# Patient Record
Sex: MALE | Race: WHITE | Employment: FULL TIME | URBAN - METROPOLITAN AREA
[De-identification: names, ages, dates, MRNs, and addresses within clinical notes are randomized per-mention and may not be internally consistent; named-entity substitution may affect disease eponyms.]

---

## 2020-03-25 ENCOUNTER — HOSPITAL ENCOUNTER (EMERGENCY)
Facility: CLINIC | Age: 40
Discharge: HOME | End: 2020-03-25
Attending: NURSE PRACTITIONER | Admitting: NURSE PRACTITIONER
Payer: COMMERCIAL

## 2020-03-25 VITALS
HEIGHT: 73 IN | DIASTOLIC BLOOD PRESSURE: 70 MMHG | SYSTOLIC BLOOD PRESSURE: 125 MMHG | BODY MASS INDEX: 29.55 KG/M2 | TEMPERATURE: 97.6 F | OXYGEN SATURATION: 99 % | WEIGHT: 223 LBS | RESPIRATION RATE: 18 BRPM

## 2020-03-25 DIAGNOSIS — S61.112A LACERATION WITHOUT FOREIGN BODY OF LEFT THUMB WITH DAMAGE TO NAIL, INITIAL ENCOUNTER: ICD-10-CM

## 2020-03-25 PROCEDURE — 12001 RPR S/N/AX/GEN/TRNK 2.5CM/<: CPT | Mod: Z6 | Performed by: NURSE PRACTITIONER

## 2020-03-25 PROCEDURE — 99283 EMERGENCY DEPT VISIT LOW MDM: CPT | Performed by: NURSE PRACTITIONER

## 2020-03-25 PROCEDURE — 12001 RPR S/N/AX/GEN/TRNK 2.5CM/<: CPT | Performed by: NURSE PRACTITIONER

## 2020-03-25 PROCEDURE — 99283 EMERGENCY DEPT VISIT LOW MDM: CPT | Mod: 25 | Performed by: NURSE PRACTITIONER

## 2020-03-25 ASSESSMENT — MIFFLIN-ST. JEOR: SCORE: 1985.4

## 2020-03-25 NOTE — PHARMACY
Writer called into room per Med Scribe [Temp] protocol.    Unable to Medication Reconcile patient per ED nurse Nela.    Will attempt at another opportunity.

## 2020-03-25 NOTE — ED AVS SNAPSHOT
[facility]  [address] [facility] [address] [address]  Phone:  [phone]  Fax:  [phone]                                    Aric Alcala   MRN: [id number]    Department:  [facility]   Date of Visit:  3/25/2020           After Visit Summary Signature Page    I have received my discharge instructions, and my questions have been answered. I have discussed any challenges I see with this plan with the nurse or doctor.    ..........................................................................................................................................  Patient/Patient Representative Signature      ..........................................................................................................................................  Patient Representative Print Name and Relationship to Patient    ..................................................               ................................................  Date                                   Time    ..........................................................................................................................................  Reviewed by Signature/Title    ...................................................              ..............................................  Date                                               Time          22EPIC Rev 08/18

## 2020-03-25 NOTE — ED NOTES
Pt has lac to left thumb from today, he cutting sheet rock with new blade, CMS intact, bleeding controled

## 2020-03-25 NOTE — DISCHARGE INSTRUCTIONS
Return for suture removal in 8-9 days  Apply bacitracin or vaseline or aquaphor twice daily until well healed  Leave open to air as much as possible after removing bandage tomorrow am.  Return if concerns of infection  Tetanus was updated in 2018 and not indicated today  8 sutures placed

## 2020-03-25 NOTE — ED PROVIDER NOTES
"  History     Chief Complaint   Patient presents with     Laceration     left thumb laceration while cutting sheetrock     HPI  Aric Alcala is a 39 year old male who presents to the emergency department with a left thumb laceration that was incurred while at home today and attempting to cut sheet rock with an Exacto knife and subsequently lacerated the lateral distal pad of his left thumb.  Patient reports mild pain initially and currently there is no pain.  Patient believes his tetanus is up-to-date.  Patient states while this is not a work-related injury his work requires that he hold a handgun as he is a  and he is a left-handed shooter.  Patient denies fever, aches, chills, sweats, ear pain, eye pain, throat pain, cough, wheezing, shortness of breath, abdominal pain, nausea, vomiting, diarrhea, dysuria, speech difficulty, mental confusion, thoughts of harming self.  Patient reports feeling well otherwise    Allergies:  No Known Allergies    Problem List:    There are no active problems to display for this patient.       Past Medical History:    No past medical history on file.    Past Surgical History:    No past surgical history on file.    Family History:    No family history on file.    Social History:  Marital Status:  Single [1]  Social History     Tobacco Use     Smoking status: Not on file   Substance Use Topics     Alcohol use: Not on file     Drug use: Not on file        Medications:    No current outpatient medications on file.        Review of Systems  As mentioned above in the history present illness. All other systems were reviewed and are negative.    Physical Exam   BP: 125/70  Heart Rate: 66  Temp: 97.6  F (36.4  C)  Resp: 18  Height: 185.4 cm (6' 1\")  Weight: 101.2 kg (223 lb)  SpO2: 99 %      Physical Exam  Vitals signs and nursing note reviewed.   Constitutional:       General: He is not in acute distress.     Appearance: He is well-developed. He is not diaphoretic.   HENT: " "     Head: Normocephalic and atraumatic.      Right Ear: External ear normal.      Left Ear: External ear normal.      Nose: Nose normal.   Eyes:      General:         Right eye: No discharge.         Left eye: No discharge.      Conjunctiva/sclera: Conjunctivae normal.   Cardiovascular:      Rate and Rhythm: Normal rate and regular rhythm.      Heart sounds: Normal heart sounds. No murmur. No friction rub. No gallop.    Pulmonary:      Effort: Pulmonary effort is normal.      Breath sounds: Normal breath sounds. No stridor. No wheezing.   Musculoskeletal:      Left hand: He exhibits tenderness (at laceration site) and laceration (2 cm by 4 mm depth  U shaped flap laceration without bony or tendon involvement with a sliver of distal lateral nail bed involvement). He exhibits normal range of motion, no bony tenderness, normal two-point discrimination, normal capillary refill, no deformity and no swelling. Normal sensation noted. Decreased sensation is not present in the ulnar distribution, is not present in the medial redistribution and is not present in the radial distribution. Normal strength noted. He exhibits no finger abduction.   Skin:     General: Skin is warm.      Findings: No rash.   Neurological:      Mental Status: He is alert and oriented to person, place, and time.         ED Course        Procedures    [facility] Procedure Note          Laceration Repair:      Laceration repair  Performed by: DIOGENES Ryo CNP  Authorized by: DIOGENES Roy CNP  Consent: Verbal consent obtained.  Risks and benefits: risks, benefits and alternatives were discussed  Patient understanding: patient states understanding of the procedure being performed  Site marked: the operative site was identified  Time out: Immediately prior to procedure a \"time out\" was called to verify the correct patient, procedure, equipment, support staff and site/side marked as required.    Preparation: Patient was prepped and " draped in usual sterile fashion.  Irrigation solution: saline    Body area: left thumb distal third and lateral side of thumb with sliver of distal lateral nail bed shaved off  Laceration length: 2 cm by 4 mm depth  Contamination: The wound is not contaminated.  Foreign bodies:none  Tendon involvement:none  Anesthesia: Digital blockinfiltration  Local anesthetic: Bupivacaine 0.25% withoutepinephrine and without buffer.  Anesthetic total: 6 ml    Debridement: irrigated with tap water and hibiclens  Skin closure:A total of 8  4.0 Ethylon  Technique: interrupted  Approximation: close  Approximation difficulty: simple      Patient tolerance: Patient tolerated the procedure well with no immediate complications.    No results found for this or any previous visit (from the past 24 hour(s)).    Medications - No data to display    Assessments & Plan (with Medical Decision Making)  Patient presents to the emergency room with a left thumb laceration that is a near avulsion without bony or tendon involvement.  Digital block placed.  8 sutures placed.  Patient tolerated the procedure well.  Patient neurovascularly stable pre-and post procedure and subsequently patient discharged in stable condition.  Due to workability of needing to utilize his thumb for possible handgun use recommend be off work until Monday the 30th.  Wound care discussed.  Patient discharged in stable condition.  Tetanus not indicated today and was updated in 2018.     I have reviewed the nursing notes.    I have reviewed the findings, diagnosis, plan and need for follow up with the patient.      There are no discharge medications for this patient.      Final diagnoses:   Laceration without foreign body of left thumb with damage to nail, initial encounter       3/25/2020   [facility] EMERGENCY DEPARTMENT     Christi Ferreira APRN CNP  03/25/20 1022       Christi Ferreira APRN CNP  03/27/20 [id number]

## 2022-04-05 ENCOUNTER — OFFICE VISIT (OUTPATIENT)
Dept: FAMILY MEDICINE | Facility: CLINIC | Age: 42
End: 2022-04-05
Payer: COMMERCIAL

## 2022-04-05 VITALS
HEIGHT: 73 IN | OXYGEN SATURATION: 98 % | WEIGHT: 226 LBS | BODY MASS INDEX: 29.95 KG/M2 | SYSTOLIC BLOOD PRESSURE: 100 MMHG | TEMPERATURE: 96.6 F | HEART RATE: 49 BPM | DIASTOLIC BLOOD PRESSURE: 67 MMHG

## 2022-04-05 DIAGNOSIS — D22.9 NUMEROUS MOLES: ICD-10-CM

## 2022-04-05 DIAGNOSIS — Z13.1 SCREENING FOR DIABETES MELLITUS: ICD-10-CM

## 2022-04-05 DIAGNOSIS — Z00.00 ROUTINE GENERAL MEDICAL EXAMINATION AT A HEALTH CARE FACILITY: Primary | ICD-10-CM

## 2022-04-05 DIAGNOSIS — Z13.220 SCREENING FOR HYPERLIPIDEMIA: ICD-10-CM

## 2022-04-05 PROBLEM — S41.152D DOG BITE OF ARM, LEFT, SUBSEQUENT ENCOUNTER: Status: ACTIVE | Noted: 2018-10-17
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
## 2022-04-05 PROBLEM — L08.9 SKIN INFECTION: Status: ACTIVE | Noted: 2018-10-24

## 2022-04-05 LAB
CHOLEST SERPL-MCNC: 173 MG/DL
FASTING STATUS PATIENT QL REPORTED: NO
FASTING STATUS PATIENT QL REPORTED: NO
GLUCOSE BLD-MCNC: 85 MG/DL (ref 70–99)
HDLC SERPL-MCNC: 43 MG/DL
LDLC SERPL CALC-MCNC: 99 MG/DL
NONHDLC SERPL-MCNC: 130 MG/DL
TRIGL SERPL-MCNC: 155 MG/DL

## 2022-04-05 PROCEDURE — 36415 COLL VENOUS BLD VENIPUNCTURE: CPT | Performed by: NURSE PRACTITIONER

## 2022-04-05 PROCEDURE — 82947 ASSAY GLUCOSE BLOOD QUANT: CPT | Performed by: NURSE PRACTITIONER

## 2022-04-05 PROCEDURE — 99386 PREV VISIT NEW AGE 40-64: CPT | Performed by: NURSE PRACTITIONER

## 2022-04-05 PROCEDURE — 80061 LIPID PANEL: CPT | Performed by: NURSE PRACTITIONER

## 2022-04-05 ASSESSMENT — ENCOUNTER SYMPTOMS
DIARRHEA: 0
EYE PAIN: 0
HEMATURIA: 0
HEMATOCHEZIA: 0
NAUSEA: 0
MYALGIAS: 0
COUGH: 0
SORE THROAT: 0
FREQUENCY: 0
NERVOUS/ANXIOUS: 1
SHORTNESS OF BREATH: 0
DYSURIA: 0
CHILLS: 0
HEARTBURN: 1
WEAKNESS: 0
PALPITATIONS: 0
HEADACHES: 0
PARESTHESIAS: 0
JOINT SWELLING: 0
CONSTIPATION: 0
FEVER: 0
DIZZINESS: 0
ARTHRALGIAS: 0
ABDOMINAL PAIN: 0

## 2022-04-05 ASSESSMENT — PATIENT HEALTH QUESTIONNAIRE - PHQ9
10. IF YOU CHECKED OFF ANY PROBLEMS, HOW DIFFICULT HAVE THESE PROBLEMS MADE IT FOR YOU TO DO YOUR WORK, TAKE CARE OF THINGS AT HOME, OR GET ALONG WITH OTHER PEOPLE: SOMEWHAT DIFFICULT
SUM OF ALL RESPONSES TO PHQ QUESTIONS 1-9: 6
SUM OF ALL RESPONSES TO PHQ QUESTIONS 1-9: 6

## 2022-04-05 NOTE — PROGRESS NOTES
SUBJECTIVE:   CC: Aric Alcala is an 41 year old male who presents for preventative health visit.     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   Doing therapy once a week.  Thinking about medical marijuana.  Taking melatonin and CBD to help him sleep, seems to be working well.  Declines any assistance with these issues today.  No SI or HI.       Scheduled for derm consult in June, needs referral placed.  Going for a few moles that his wife is concerned about.  Denies hx of previous skin cancer.       Started working out about 6 weeks ago, prior to that hadn't worked out in years.       Patient has been advised of split billing requirements and indicates understanding: Yes  Healthy Habits:     Getting at least 3 servings of Calcium per day:  Yes    Bi-annual eye exam:  NO    Dental care twice a year:  Yes    Sleep apnea or symptoms of sleep apnea:  Excessive snoring    Diet:  Regular (no restrictions)    Frequency of exercise:  2-3 days/week    Duration of exercise:  45-60 minutes    Taking medications regularly:  Yes    Medication side effects:  None    PHQ-2 Total Score: 3    Additional concerns today:  No      Today's PHQ-2 Score:   PHQ-2 ( 1999 [employer]) 4/5/2022   Q1: Little interest or pleasure in doing things 2   Q2: Feeling down, depressed or hopeless 1   PHQ-2 Score 3   Q1: Little interest or pleasure in doing things More than half the days   Q2: Feeling down, depressed or hopeless Several days   PHQ-2 Score 3       Abuse: Current or Past(Physical, Sexual or Emotional)- No  Do you feel safe in your environment? Yes    Have you ever done Advance Care Planning? (For example, a Health Directive, POLST, or a discussion with a medical provider or your loved ones about your wishes): No, advance care planning information given to patient to review.  Patient plans to discuss their wishes with loved ones or provider.      Social History     Tobacco Use     Smoking status: Never Smoker     Smokeless  "tobacco: Never Used   Substance Use Topics     Alcohol use: Not Currently     If you drink alcohol do you typically have >3 drinks per day or >7 drinks per week? Not applicable    Alcohol Use 4/5/2022   Prescreen: >3 drinks/day or >7 drinks/week? Not Applicable   Prescreen: >3 drinks/day or >7 drinks/week? -       Last PSA: No results found for: PSA    Reviewed orders with patient. Reviewed health maintenance and updated orders accordingly - Yes      Reviewed and updated as needed this visit by clinical staff   Tobacco  Allergies  Meds  Problems  Med Hx  Surg Hx  Fam Hx          Reviewed and updated as needed this visit by Provider   Tobacco  Allergies  Meds  Problems  Med Hx  Surg Hx  Fam Hx           Review of Systems   Constitutional: Negative for chills and fever.   HENT: Negative for congestion, ear pain, hearing loss and sore throat.    Eyes: Negative for pain and visual disturbance.   Respiratory: Negative for cough and shortness of breath.    Cardiovascular: Negative for chest pain, palpitations and peripheral edema.   Gastrointestinal: Positive for heartburn. Negative for abdominal pain, constipation, diarrhea, hematochezia and nausea.   Genitourinary: Negative for dysuria, frequency, genital sores, hematuria, impotence, penile discharge and urgency.   Musculoskeletal: Negative for arthralgias, joint swelling and myalgias.   Skin: Negative for rash.   Neurological: Negative for dizziness, weakness, headaches and paresthesias.   Psychiatric/Behavioral: Positive for mood changes. The patient is nervous/anxious.        OBJECTIVE:   /67   Pulse (!) 49   Temp (!) 96.6  F (35.9  C)   Ht 1.854 m (6' 1\")   Wt 102.5 kg (226 lb)   SpO2 98%   BMI 29.82 kg/m      Physical Exam  GENERAL: healthy, alert and no distress  EYES: Eyes grossly normal to inspection, PERRL and conjunctivae and sclerae normal  HENT: ear canals and TM's normal, nose and mouth without ulcers or lesions  NECK: no " "adenopathy, no asymmetry, masses, or scars and thyroid normal to palpation  RESP: lungs clear to auscultation - no rales, rhonchi or wheezes  CV: regular rate and rhythm, normal S1 S2, no S3 or S4, no murmur, click or rub, no peripheral edema and peripheral pulses strong  ABDOMEN: soft, nontender, no hepatosplenomegaly, no masses and bowel sounds normal  MS: no gross musculoskeletal defects noted, no edema  SKIN: no suspicious lesions or rashes.  Numerous tan macules scattered on upper back.      NEURO: Normal strength and tone, mentation intact and speech normal  PSYCH: mentation appears normal, affect normal/bright    Diagnostic Test Results:  Labs reviewed in Epic      ASSESSMENT/PLAN:   (Z00.00) Routine general medical examination at a health care facility  (primary encounter diagnosis)  Comment:   Plan: continue annual physical exams    (Z13.220) Screening for hyperlipidemia  Comment:   Plan: Lipid panel reflex to direct LDL Fasting          (Z13.1) Screening for diabetes mellitus  Comment:   Plan: Glucose          (D22.9) Numerous moles  Comment: already scheduled for appt with derm on 6/10/22, states he needs referral.    Plan: Adult Dermatology Referral            Patient has been advised of split billing requirements and indicates understanding: Yes    COUNSELING:   Reviewed preventive health counseling, as reflected in patient instructions    Estimated body mass index is 29.82 kg/m  as calculated from the following:    Height as of this encounter: 1.854 m (6' 1\").    Weight as of this encounter: 102.5 kg (226 lb).     Weight management plan: work on diet and exercise    He reports that he has never smoked. He has never used smokeless tobacco.      Counseling Resources:  ATP IV Guidelines  Pooled Cohorts Equation Calculator  FRAX Risk Assessment  ICSI Preventive Guidelines  Dietary Guidelines for Americans, 2010  USDA's MyPlate  ASA Prophylaxis  Lung CA Screening    Annetta Mehta, [facility] " [facility] [place]  Answers for HPI/ROS submitted by the patient on 4/5/2022  If you checked off any problems, how difficult have these problems made it for you to do your work, take care of things at home, or get along with other people?: Somewhat difficult  PHQ9 TOTAL SCORE: 6

## 2022-04-05 NOTE — PATIENT INSTRUCTIONS
Referral placed to dermatology.  Okay to keep appointment in June.    Labs today for blood sugar and cholesterol.  I'll send a message regarding results via [employer].       Preventive Health Recommendations  Male Ages 40 to 49    Yearly exam:             See your health care provider every year in order to  o   Review health changes.   o   Discuss preventive care.    o   Review your medicines if your doctor has prescribed any.    You should be tested each year for STDs (sexually transmitted diseases) if you re at risk.     Have a cholesterol test every 5 years.     Have a colonoscopy (test for colon cancer) if someone in your family has had colon cancer or polyps before age 50.     After age 45, have a diabetes test (fasting glucose). If you are at risk for diabetes, you should have this test every 3 years.      Talk with your health care provider about whether or not a prostate cancer screening test (PSA) is right for you.    Shots: Get a flu shot each year. Get a tetanus shot every 10 years.     Nutrition:    Eat at least 5 servings of fruits and vegetables daily.     Eat whole-grain bread, whole-wheat pasta and brown rice instead of white grains and rice.     Get adequate Calcium and Vitamin D.     Lifestyle    Exercise for at least 150 minutes a week (30 minutes a day, 5 days a week). This will help you control your weight and prevent disease.     Limit alcohol to one drink per day.     No smoking.     Wear sunscreen to prevent skin cancer.     See your dentist every six months for an exam and cleaning.

## 2022-04-06 ASSESSMENT — PATIENT HEALTH QUESTIONNAIRE - PHQ9: SUM OF ALL RESPONSES TO PHQ QUESTIONS 1-9: 6

## 2022-10-11 ENCOUNTER — APPOINTMENT (OUTPATIENT)
Dept: GENERAL RADIOLOGY | Facility: CLINIC | Age: 42
End: 2022-10-11
Attending: NURSE PRACTITIONER
Payer: COMMERCIAL

## 2022-10-11 ENCOUNTER — NURSE TRIAGE (OUTPATIENT)
Dept: NURSING | Facility: CLINIC | Age: 42
End: 2022-10-11

## 2022-10-11 ENCOUNTER — HOSPITAL ENCOUNTER (EMERGENCY)
Facility: CLINIC | Age: 42
Discharge: HOME | End: 2022-10-11
Attending: NURSE PRACTITIONER | Admitting: NURSE PRACTITIONER
Payer: COMMERCIAL

## 2022-10-11 VITALS
BODY MASS INDEX: 29.16 KG/M2 | OXYGEN SATURATION: 99 % | HEIGHT: 73 IN | DIASTOLIC BLOOD PRESSURE: 85 MMHG | RESPIRATION RATE: 18 BRPM | WEIGHT: 220 LBS | HEART RATE: 59 BPM | TEMPERATURE: 96.8 F | SYSTOLIC BLOOD PRESSURE: 136 MMHG

## 2022-10-11 DIAGNOSIS — S20.211A RIB CONTUSION, RIGHT, INITIAL ENCOUNTER: ICD-10-CM

## 2022-10-11 DIAGNOSIS — R07.89 RIGHT-SIDED CHEST WALL PAIN: ICD-10-CM

## 2022-10-11 DIAGNOSIS — R55 SYNCOPE: ICD-10-CM

## 2022-10-11 LAB
ALBUMIN SERPL BCG-MCNC: 4.2 G/DL (ref 3.5–5.2)
ALP SERPL-CCNC: 59 U/L (ref 40–129)
ALT SERPL W P-5'-P-CCNC: 28 U/L (ref 10–50)
ANION GAP SERPL CALCULATED.3IONS-SCNC: 8 MMOL/L (ref 7–15)
AST SERPL W P-5'-P-CCNC: 22 U/L (ref 10–50)
BASOPHILS # BLD AUTO: 0.1 10E3/UL (ref 0–0.2)
BASOPHILS NFR BLD AUTO: 1 %
BILIRUB SERPL-MCNC: 1 MG/DL
BUN SERPL-MCNC: 20.9 MG/DL (ref 6–20)
CALCIUM SERPL-MCNC: 9.6 MG/DL (ref 8.6–10)
CHLORIDE SERPL-SCNC: 103 MMOL/L (ref 98–107)
CREAT SERPL-MCNC: 1.13 MG/DL (ref 0.67–1.17)
DEPRECATED HCO3 PLAS-SCNC: 30 MMOL/L (ref 22–29)
EOSINOPHIL # BLD AUTO: 0.4 10E3/UL (ref 0–0.7)
EOSINOPHIL NFR BLD AUTO: 4 %
ERYTHROCYTE [DISTWIDTH] IN BLOOD BY AUTOMATED COUNT: 12.7 % (ref 10–15)
GFR SERPL CREATININE-BSD FRML MDRD: 84 ML/MIN/1.73M2
GLUCOSE SERPL-MCNC: 75 MG/DL (ref 70–99)
HCT VFR BLD AUTO: 46.7 % (ref 40–53)
HGB BLD-MCNC: 15.2 G/DL (ref 13.3–17.7)
IMM GRANULOCYTES # BLD: 0 10E3/UL
IMM GRANULOCYTES NFR BLD: 0 %
LYMPHOCYTES # BLD AUTO: 2.3 10E3/UL (ref 0.8–5.3)
LYMPHOCYTES NFR BLD AUTO: 27 %
MCH RBC QN AUTO: 27.7 PG (ref 26.5–33)
MCHC RBC AUTO-ENTMCNC: 32.5 G/DL (ref 31.5–36.5)
MCV RBC AUTO: 85 FL (ref 78–100)
MONOCYTES # BLD AUTO: 0.5 10E3/UL (ref 0–1.3)
MONOCYTES NFR BLD AUTO: 6 %
NEUTROPHILS # BLD AUTO: 5.1 10E3/UL (ref 1.6–8.3)
NEUTROPHILS NFR BLD AUTO: 62 %
NRBC # BLD AUTO: 0 10E3/UL
NRBC BLD AUTO-RTO: 0 /100
PLATELET # BLD AUTO: 205 10E3/UL (ref 150–450)
POTASSIUM SERPL-SCNC: 4.3 MMOL/L (ref 3.4–5.3)
PROT SERPL-MCNC: 7.3 G/DL (ref 6.4–8.3)
RBC # BLD AUTO: 5.48 10E6/UL (ref 4.4–5.9)
SODIUM SERPL-SCNC: 141 MMOL/L (ref 136–145)
TSH SERPL DL<=0.005 MIU/L-ACNC: 2.25 UIU/ML (ref 0.3–4.2)
WBC # BLD AUTO: 8.4 10E3/UL (ref 4–11)

## 2022-10-11 PROCEDURE — 36415 COLL VENOUS BLD VENIPUNCTURE: CPT | Performed by: NURSE PRACTITIONER

## 2022-10-11 PROCEDURE — 85025 COMPLETE CBC W/AUTO DIFF WBC: CPT | Performed by: NURSE PRACTITIONER

## 2022-10-11 PROCEDURE — 86618 LYME DISEASE ANTIBODY: CPT | Performed by: NURSE PRACTITIONER

## 2022-10-11 PROCEDURE — 99284 EMERGENCY DEPT VISIT MOD MDM: CPT

## 2022-10-11 PROCEDURE — 84443 ASSAY THYROID STIM HORMONE: CPT | Performed by: NURSE PRACTITIONER

## 2022-10-11 PROCEDURE — 80053 COMPREHEN METABOLIC PANEL: CPT | Performed by: NURSE PRACTITIONER

## 2022-10-11 PROCEDURE — 71101 X-RAY EXAM UNILAT RIBS/CHEST: CPT | Mod: RT

## 2022-10-11 PROCEDURE — 99282 EMERGENCY DEPT VISIT SF MDM: CPT | Performed by: NURSE PRACTITIONER

## 2022-10-11 ASSESSMENT — ACTIVITIES OF DAILY LIVING (ADL): ADLS_ACUITY_SCORE: 33

## 2022-10-11 NOTE — TELEPHONE ENCOUNTER
Annetta spouse is calling and patient is not present to triage.  FNA advised to phone back when present with spouse and Annetta agrees.     Reason for Disposition    Information only question and nurse able to answer    Additional Information    Negative: Nursing judgment    Negative: Nursing judgment    Negative: Nursing judgment    Negative: Nursing judgment    Protocols used: INFORMATION ONLY CALL - NO TRIAGE-A-OH

## 2022-10-11 NOTE — DISCHARGE INSTRUCTIONS
I recommend primary care referral for further ongoing care.  I recommend a Holter monitor to make sure there is no cardiac etiology for your sometimes.  After your Holter monitor please follow-up with primary care to review results.

## 2022-10-11 NOTE — ED TRIAGE NOTES
Pt here with right rib pain that occurred two weeks ago tomorrow after passing out. Pt was on a fishing trip with his friends, not drinking and suddenly passed out while looking at a phone, hit his head, was out for 30 seconds. Pt's Wife wants to know why he passed out. Pt said he did only eat once that day. Pt did say two weeks prior he would get light headed after bending over. Denies chest pain.      Triage Assessment     Row Name 10/11/22 1138       Triage Assessment (Adult)    Airway WDL WDL       Respiratory WDL    Respiratory WDL WDL       Cardiac WDL    Cardiac WDL WDL       Cognitive/Neuro/Behavioral WDL    Cognitive/Neuro/Behavioral WDL WDL

## 2022-10-11 NOTE — ED PROVIDER NOTES
History     Chief Complaint   Patient presents with     Rib Pain     LAB REQUEST     Pt wants lyme disease and blood work     HPI  Aric Alcala is a 42 year old male who presents to the emergency room with right-sided rib pain following a fall.  Patient states he experienced a syncopal episode.  Patient states he was feeling lightheaded and went outside to get some fresh air and must have passed out.  Patient denies alcohol involvement at the time.  Patient states he had not had anything to eat either.  He was at a cabin for the weekend with his friends.  Patient states 2 weeks ago he fell and landed on his right side/ribs.  Patient reports persistent rib pain that is aggravated by deep inspiration.  Patient denies cough, shortness of breath, fever, aches, chills, sweats.  Patient denies abdominal pain, nausea, vomiting, diarrhea, constipation, dysuria, hematuria, mental confusion, left or right-sided body weakness, suicidal or homicidal thoughts.  Patient states he is wondering the cause of his syncopal episode.  Patient denies history of thyroid disease, heart disease, diabetes, previous syncopal episodes.    Allergies:  No Known Allergies    Problem List:    Patient Active Problem List    Diagnosis Date Noted     Skin infection 10/24/2018     Priority: Medium     Dog bite of arm, left, subsequent encounter 10/17/2018     Priority: Medium        Past Medical History:    History reviewed. No pertinent past medical history.    Past Surgical History:    History reviewed. No pertinent surgical history.    Family History:    Family History   Problem Relation Age of Onset     Pancreatic Cancer Mother      Hyperlipidemia Father      Hypertension Father      No Known Problems Half-Brother      No Known Problems Half-Brother        Social History:  Marital Status:  Single [1]  Social History     Tobacco Use     Smoking status: Never     Smokeless tobacco: Never   Vaping Use     Vaping Use: Never used   Substance Use  "Topics     Alcohol use: Not Currently     Drug use: Never        Medications:    Multiple Vitamin (MULTIVITAMIN ADULT PO)  omeprazole (PRILOSEC) 20 MG DR capsule      Review of Systems  As mentioned above in the history present illness. All other systems were reviewed and are negative.    Physical Exam   BP: 136/85  Pulse: 59  Temp: 96.8  F (36  C)  Resp: 18  Height: 185.4 cm (6' 1\")  Weight: 99.8 kg (220 lb)  SpO2: 99 %      Physical Exam  Vitals and nursing note reviewed.   Constitutional:       General: He is not in acute distress.     Appearance: He is well-developed and well-nourished. He is not ill-appearing, toxic-appearing or diaphoretic.   HENT:      Head: Normocephalic and atraumatic.      Right Ear: Hearing, tympanic membrane, ear canal and external ear normal.      Left Ear: Hearing, tympanic membrane, ear canal and external ear normal.      Nose: Nose normal.      Mouth/Throat:      Mouth: Oropharynx is clear and moist. Mucous membranes are moist.      Pharynx: No oropharyngeal exudate or posterior oropharyngeal erythema.   Eyes:      General: Lids are normal.         Right eye: No discharge.         Left eye: No discharge.      Extraocular Movements: Extraocular movements intact.      Right eye: No nystagmus.      Left eye: No nystagmus.      Conjunctiva/sclera: Conjunctivae normal.      Pupils: Pupils are equal, round, and reactive to light.   Neck:      Vascular: No carotid bruit.   Cardiovascular:      Rate and Rhythm: Normal rate and regular rhythm.      Pulses: Intact distal pulses.      Heart sounds: Normal heart sounds. No murmur heard.    No friction rub. No gallop.   Pulmonary:      Effort: Pulmonary effort is normal. No respiratory distress.      Breath sounds: Normal breath sounds. No stridor. No wheezing or rales.   Chest:      Chest wall: Tenderness (chest wall tenderness mid clavicular line to mid axillary line ribs 12 area without soft tissue swelling, crepitus, bruising) present. "   Abdominal:      General: Bowel sounds are normal.      Palpations: Abdomen is soft.      Tenderness: There is no abdominal tenderness. There is no right CVA tenderness, left CVA tenderness or guarding.   Musculoskeletal:         General: No tenderness or edema.      Cervical back: Normal range of motion.   Lymphadenopathy:      Cervical: No cervical adenopathy.   Skin:     General: Skin is warm.      Capillary Refill: Capillary refill takes less than 2 seconds.      Findings: No rash.   Neurological:      General: No focal deficit present.      Mental Status: He is alert and oriented to person, place, and time.      GCS: GCS eye subscore is 4. GCS verbal subscore is 5. GCS motor subscore is 6.      Cranial Nerves: Cranial nerves are intact. No cranial nerve deficit, dysarthria or facial asymmetry.      Sensory: Sensation is intact.      Motor: No weakness, tremor or abnormal muscle tone.      Coordination: Coordination is intact. Coordination normal. Finger-Nose-Finger Test and Heel to Shin Test normal. Rapid alternating movements normal.      Gait: Gait is intact. Gait normal.      Deep Tendon Reflexes: Reflexes are normal and symmetric.      Reflex Scores:       Patellar reflexes are 2+ on the right side and 2+ on the left side.  Psychiatric:         Mood and Affect: Mood and affect and mood normal.         Behavior: Behavior normal. Behavior is cooperative.         ED Course                 Procedures      Results for orders placed or performed during the hospital encounter of 10/11/22 (from the past 24 hour(s))   CBC with platelets differential    Narrative    The following orders were created for panel order CBC with platelets differential.  Procedure                               Abnormality         Status                     ---------                               -----------         ------                     CBC with platelets and d...[[id number]]                      Final result                 Please  view results for these tests on the individual orders.   Comprehensive metabolic panel   Result Value Ref Range    Sodium 141 136 - 145 mmol/L    Potassium 4.3 3.4 - 5.3 mmol/L    Chloride 103 98 - 107 mmol/L    Carbon Dioxide (CO2) 30 (H) 22 - 29 mmol/L    Anion Gap 8 7 - 15 mmol/L    Urea Nitrogen 20.9 (H) 6.0 - 20.0 mg/dL    Creatinine 1.13 0.67 - 1.17 mg/dL    Calcium 9.6 8.6 - 10.0 mg/dL    Glucose 75 70 - 99 mg/dL    Alkaline Phosphatase 59 40 - 129 U/L    AST 22 10 - 50 U/L    ALT 28 10 - 50 U/L    Protein Total 7.3 6.4 - 8.3 g/dL    Albumin 4.2 3.5 - 5.2 g/dL    Bilirubin Total 1.0 <=1.2 mg/dL    GFR Estimate 84 >60 mL/min/1.73m2   TSH with free T4 reflex   Result Value Ref Range    TSH 2.25 0.30 - 4.20 uIU/mL   CBC with platelets and differential   Result Value Ref Range    WBC Count 8.4 4.0 - 11.0 10e3/uL    RBC Count 5.48 4.40 - 5.90 10e6/uL    Hemoglobin 15.2 13.3 - 17.7 g/dL    Hematocrit 46.7 40.0 - 53.0 %    MCV 85 78 - 100 fL    MCH 27.7 26.5 - 33.0 pg    MCHC 32.5 31.5 - 36.5 g/dL    RDW 12.7 10.0 - 15.0 %    Platelet Count 205 150 - 450 10e3/uL    % Neutrophils 62 %    % Lymphocytes 27 %    % Monocytes 6 %    % Eosinophils 4 %    % Basophils 1 %    % Immature Granulocytes 0 %    NRBCs per 100 WBC 0 <1 /100    Absolute Neutrophils 5.1 1.6 - 8.3 10e3/uL    Absolute Lymphocytes 2.3 0.8 - 5.3 10e3/uL    Absolute Monocytes 0.5 0.0 - 1.3 10e3/uL    Absolute Eosinophils 0.4 0.0 - 0.7 10e3/uL    Absolute Basophils 0.1 0.0 - 0.2 10e3/uL    Absolute Immature Granulocytes 0.0 <=0.4 10e3/uL    Absolute NRBCs 0.0 10e3/uL   Ribs XR, unilat 3 views + PA chest, right    Narrative    RIGHT RIBS AND CHEST, THREE VIEWS  10/11/2022 1:30 PM     INDICATION: Right-sided chest and rib pain after a recent injury.    COMPARISON: None.      Impression    IMPRESSION: No rib fracture.  Normal heart and lungs.     IVETTE QURESHI MD         SYSTEM ID:  [id number]       Medications - No data to display    Assessments & Plan (with  Medical Decision Making)     I have reviewed the nursing notes.    I have reviewed the findings, diagnosis, plan and need for follow up with the patient.  42-year-old male presents to the emergency department for right-sided chest wall pain following a syncopal episode 2 weeks ago and subsequent falling and injuring his ribs.  Patient reporting persistent pain with deep inspiration.  Patient states unknown etiology for the syncopal episode.  Patient denies history of heart problems.  Patient denies metabolic abnormality history, thyroid disease.  On exam patient is alert and orientated.  Work-up to include CBC, metabolic panel.  Pending is a Lyme's test.  Recommend an EKG and this was declined.  Recommend 3-day Holter monitor and patient at this point states he will decline.  Recommend primary care follow-up.  Reviewed x-ray is no acute rib fracture.  Reviewed other labs is normal.  Patient states he wishes to leave now.  Encourage follow-up.  Patient discharged in stable condition.    New Prescriptions    No medications on file       Final diagnoses:   Right-sided chest wall pain   Rib contusion, right, initial encounter   Syncope       10/11/2022   [facility] EMERGENCY DEPT     Christi Ferreira, DIOGENES CNP  10/11/22 1433       Christi Ferreira, DIOGENES CNP  10/11/22 1449

## 2022-10-12 LAB — B BURGDOR IGG+IGM SER QL: 0.76

## 2022-10-12 NOTE — RESULT ENCOUNTER NOTE
Final result for Lyme Disease Total Abs Bld with Reflex to Confirm CLIA is NEGATIVE.    No change in treatment per [facility] Lab Result Lyme Disease protocol.

## 2022-10-22 ENCOUNTER — HEALTH MAINTENANCE LETTER (OUTPATIENT)
Age: 42
End: 2022-10-22

## 2023-01-21 ENCOUNTER — OFFICE VISIT (OUTPATIENT)
Dept: URGENT CARE | Facility: URGENT CARE | Age: 43
End: 2023-01-21

## 2023-01-21 VITALS
TEMPERATURE: 100.2 F | BODY MASS INDEX: 28.76 KG/M2 | HEART RATE: 77 BPM | SYSTOLIC BLOOD PRESSURE: 117 MMHG | WEIGHT: 218 LBS | DIASTOLIC BLOOD PRESSURE: 68 MMHG | OXYGEN SATURATION: 98 %

## 2023-01-21 DIAGNOSIS — J02.0 STREP THROAT: Primary | ICD-10-CM

## 2023-01-21 DIAGNOSIS — R07.0 THROAT PAIN: ICD-10-CM

## 2023-01-21 LAB
DEPRECATED S PYO AG THROAT QL EIA: POSITIVE
FLUAV AG SPEC QL IA: NEGATIVE
FLUBV AG SPEC QL IA: NEGATIVE

## 2023-01-21 PROCEDURE — 87880 STREP A ASSAY W/OPTIC: CPT | Performed by: NURSE PRACTITIONER

## 2023-01-21 PROCEDURE — 87804 INFLUENZA ASSAY W/OPTIC: CPT | Mod: 59 | Performed by: NURSE PRACTITIONER

## 2023-01-21 PROCEDURE — 99213 OFFICE O/P EST LOW 20 MIN: CPT | Performed by: NURSE PRACTITIONER

## 2023-01-21 RX ORDER — PENICILLIN V POTASSIUM 500 MG/1
500 TABLET, FILM COATED ORAL 2 TIMES DAILY
Qty: 20 TABLET | Refills: 0 | Status: SHIPPED | OUTPATIENT
Start: 2023-01-21 | End: 2023-01-31

## 2023-01-21 NOTE — PROGRESS NOTES
Assessment & Plan     Strep throat    - penicillin V (VEETID) 500 MG tablet  Dispense: 20 tablet; Refill: 0    Throat pain    - Streptococcus A Rapid Screen w/Reflex to PCR - Clinic Collect  - Influenza A & B Antigen - Clinic Collect     Reviewed positive rapid strep results during visit. Prescription sent to pharmacy for penicillin twice daily for 10 days. Recommended rest, fluids, tylenol as needed, gargle warm salt water, throat lozenges, chloraseptic spray. Change toothbrush after 24 hours on antibiotic. COVID testing declined. Influenza testing negative.     Follow-up with PCP if symptoms persist for 7 days, and sooner if symptoms worsen or new symptoms develop.     Discussed red flag symptoms which warrant immediate visit in emergency room    All questions were answered and patient verbalized understanding. AVS reviewed with patient.     Romi Martinez, YI, APRN, CNP 1/21/2023 9:59 AM  [facility]          Torey Corbett is a 42 year old male who presents to clinic today with his wife and son for the following health issues:  Chief Complaint   Patient presents with     Fever     Started Thursday Night      Throat Problem     Sore Throat and Neck Lymph Nodes Are Swollen      Patient presents for evaluation of sore throat. Associated symptoms: swollen lymph nodes, fever, headache, chills, body aches. Denies emesis, cough, runny nose. No immunosuppression. He developed a fever 2 nights ago of 101.9F. No known exposures, though his son was sick. He has been able to drink fluids and swallow without difficulty though it hurts. He had tylenol last night which helps temporarily.      Problem list, Medication list, Allergies, and Medical history reviewed in EPIC.    ROS:  Review of systems negative except for noted above        Objective    /68   Pulse 77   Temp 100.2  F (37.9  C)   Wt 98.9 kg (218 lb)   SpO2 98%   BMI 28.76 kg/m    Physical Exam  Constitutional:        General: He is not in acute distress.     Appearance: He is not toxic-appearing or diaphoretic.   HENT:      Head: Normocephalic and atraumatic.      Right Ear: Tympanic membrane, ear canal and external ear normal.      Left Ear: Tympanic membrane, ear canal and external ear normal.      Nose: Nose normal.      Mouth/Throat:      Mouth: Mucous membranes are moist.      Pharynx: Oropharynx is clear. Posterior oropharyngeal erythema present. No oropharyngeal exudate.      Tonsils: No tonsillar abscesses. 2+ on the right. 2+ on the left.      Comments: Moderate oropharyngeal erythema  Eyes:      Conjunctiva/sclera: Conjunctivae normal.   Neck:      Comments: Mild bilateral anterior cervical adenopathy  Cardiovascular:      Rate and Rhythm: Normal rate and regular rhythm.      Heart sounds: Normal heart sounds.   Pulmonary:      Effort: Pulmonary effort is normal. No respiratory distress.      Breath sounds: Normal breath sounds. No wheezing, rhonchi or rales.   Lymphadenopathy:      Cervical: Cervical adenopathy present.   Skin:     General: Skin is warm and dry.   Neurological:      Mental Status: He is alert.            Labs:  Results for orders placed or performed in visit on 01/21/23   Streptococcus A Rapid Screen w/Reflex to PCR - Clinic Collect     Status: Abnormal    Specimen: Throat; Swab   Result Value Ref Range    Group A Strep antigen Positive (A) Negative   Influenza A & B Antigen - Clinic Collect     Status: Normal    Specimen: Nose; Swab   Result Value Ref Range    Influenza A antigen Negative Negative    Influenza B antigen Negative Negative    Narrative    Test results must be correlated with clinical data. If necessary, results should be confirmed by a molecular assay or viral culture.

## 2023-06-01 ENCOUNTER — HEALTH MAINTENANCE LETTER (OUTPATIENT)
Age: 43
End: 2023-06-01

## 2024-06-02 ENCOUNTER — HEALTH MAINTENANCE LETTER (OUTPATIENT)
Age: 44
End: 2024-06-02

## 2025-06-14 ENCOUNTER — HEALTH MAINTENANCE LETTER (OUTPATIENT)
Age: 45
End: 2025-06-14